# Patient Record
Sex: MALE | Race: BLACK OR AFRICAN AMERICAN | NOT HISPANIC OR LATINO | Employment: UNEMPLOYED | URBAN - METROPOLITAN AREA
[De-identification: names, ages, dates, MRNs, and addresses within clinical notes are randomized per-mention and may not be internally consistent; named-entity substitution may affect disease eponyms.]

---

## 2024-11-05 ENCOUNTER — OFFICE VISIT (OUTPATIENT)
Dept: URGENT CARE | Facility: CLINIC | Age: 44
End: 2024-11-05
Payer: MEDICAID

## 2024-11-05 VITALS
RESPIRATION RATE: 16 BRPM | HEIGHT: 71 IN | WEIGHT: 143 LBS | HEART RATE: 91 BPM | OXYGEN SATURATION: 98 % | DIASTOLIC BLOOD PRESSURE: 81 MMHG | BODY MASS INDEX: 20.02 KG/M2 | TEMPERATURE: 98.7 F | SYSTOLIC BLOOD PRESSURE: 126 MMHG

## 2024-11-05 DIAGNOSIS — J02.9 SORE THROAT: ICD-10-CM

## 2024-11-05 DIAGNOSIS — J02.9 VIRAL PHARYNGITIS: Primary | ICD-10-CM

## 2024-11-05 LAB — POC GROUP A STREP, PCR: NOT DETECTED

## 2024-11-05 PROCEDURE — 87651 STREP A DNA AMP PROBE: CPT | Mod: QW

## 2024-11-05 PROCEDURE — 99213 OFFICE O/P EST LOW 20 MIN: CPT

## 2024-11-05 RX ORDER — INSULIN GLARGINE 100 [IU]/ML
INJECTION, SOLUTION SUBCUTANEOUS
COMMUNITY
Start: 2023-06-23

## 2024-11-05 RX ORDER — INSULIN LISPRO 100 [IU]/ML
INJECTION, SOLUTION INTRAVENOUS; SUBCUTANEOUS
COMMUNITY
Start: 2023-04-07

## 2024-11-05 RX ORDER — BLOOD-GLUCOSE,RECEIVER,CONT
EACH MISCELLANEOUS
COMMUNITY
Start: 2024-09-13

## 2024-11-05 RX ORDER — DIVALPROEX SODIUM 500 MG/1
TABLET, FILM COATED, EXTENDED RELEASE ORAL
COMMUNITY
Start: 2020-03-17

## 2024-11-05 RX ORDER — IBUPROFEN 800 MG/1
TABLET ORAL
COMMUNITY
Start: 2024-04-22

## 2024-11-05 RX ORDER — ARIPIPRAZOLE 10 MG/1
TABLET ORAL
COMMUNITY
Start: 2024-01-04

## 2024-11-05 RX ORDER — BENZONATATE 100 MG/1
100 CAPSULE ORAL 3 TIMES DAILY PRN
COMMUNITY
Start: 2024-10-30 | End: 2024-11-06

## 2024-11-05 RX ORDER — IBUPROFEN 200 MG
CAPSULE ORAL
COMMUNITY
Start: 2023-07-26

## 2024-11-05 RX ORDER — LUMATEPERONE 42 MG/1
42 CAPSULE ORAL
COMMUNITY

## 2024-11-05 RX ORDER — IBUPROFEN 200 MG
16 TABLET ORAL
COMMUNITY
Start: 2024-10-19 | End: 2024-11-18

## 2024-11-05 NOTE — PROGRESS NOTES
[facility] URGENT CARE ERIC NOTE:      Name: Bennie Mantilla, 43 y.o.    CSN:[id number]   MRN:[id number]    PCP: No Assigned PCP Generic Provider, MD    ALL:  No Known Allergies    History:    Chief Complaint: Sore Throat (Sore throat x 5 days, worsening over last 24 hours )    Encounter Date: 11/5/2024      HPI: The history was obtained from the patient. Bennie is a 43 y.o. male, who presents with a chief complaint of Sore Throat (Sore throat x 5 days, worsening over last 24 hours ).  Patient states he has had a sore throat for 5 days that has worsened over the last 24 hours.  He states it is painful to swallow.  He also endorses a mild dry cough and mucus drainage.  He has not taken any over-the-counter medications at this time.  He denies fevers, chills, nausea, vomiting, headache, changes in vision, chest pain, shortness of breath, abdominal pain.    PMHx:    No past medical history on file.           Current Outpatient Medications   Medication Sig Dispense Refill    ARIPiprazole (Abilify) 10 mg tablet       benzonatate (Tessalon) 100 mg capsule Take 1 capsule (100 mg) by mouth 3 times a day as needed.      blood sugar diagnostic (Blood Glucose Test) strip Use  4 times a day.Dg code E10.65 .      Caplyta 42 mg capsule Take 1 capsule (42 mg) by mouth once daily.      Dexcom G7  misc       divalproex (Depakote ER) 500 mg 24 hr tablet Take by mouth.      glucagon (Glucagen) 1 mg injection INJECT 1 ML Once 1mL as needed for hypoglycemia      glucose 4 gram chewable tablet Chew 4 tablets (16 g).      ibuprofen 800 mg tablet       insulin lispro (HumaLOG) 100 unit/mL injection Use as directed 3 times daily, approx 60 units daily. E10.65 .      Lantus Solostar U-100 Insulin 100 unit/mL (3 mL) pen Inject under the skin 20 units every evening .       No current facility-administered medications for this visit.         PMSx:    Past Surgical History:   Procedure Laterality Date    MR HEAD  ANGIO WO IV CONTRAST  3/14/2023    MR HEAD ANGIO WO IV CONTRAST 3/14/2023    MR NECK ANGIO W AND WO IV CONTRAST  3/14/2023    MR NECK ANGIO W AND WO IV CONTRAST 3/14/2023    OTHER SURGICAL HISTORY  11/23/2020    Femur fracture repair       Fam Hx: No family history on file.    SOC. Hx:     Social History     Socioeconomic History    Marital status: Single     Spouse name: Not on file    Number of children: Not on file    Years of education: Not on file    Highest education level: Not on file   Occupational History    Not on file   Tobacco Use    Smoking status: Not on file    Smokeless tobacco: Not on file   Substance and Sexual Activity    Alcohol use: Not on file    Drug use: Not on file    Sexual activity: Not on file   Other Topics Concern    Not on file   Social History Narrative    Not on file     Social Drivers of Health     Financial Resource Strain: Not on file   Food Insecurity: Patient Declined (10/27/2024)    Received from [facility]    Hunger Vital Sign     Worried About Running Out of Food in the Last Year: Patient declined     Ran Out of Food in the Last Year: Patient declined   Transportation Needs: Patient Declined (10/27/2024)    Received from [facility]    PRAPARE - Transportation     Lack of Transportation (Medical): Patient declined     Lack of Transportation (Non-Medical): Patient declined   Physical Activity: Not on file   Stress: Not on file   Social Connections: Not on file   Intimate Partner Violence: Patient Declined (10/27/2024)    Received from [facility]    Humiliation, Afraid, Rape, and Kick questionnaire     Fear of Current or Ex-Partner: Patient declined     Emotionally Abused: Patient declined     Physically Abused: Patient declined     Sexually Abused: Patient declined   Housing Stability: Patient Declined (10/27/2024)    Received from [facility]    Housing Stability Vital Sign     Unable to Pay for Housing in the Last Year: Patient declined     Number of Times Moved in the Last  Year: 2     Homeless in the Last Year: Patient declined         Vitals:    11/05/24 1208   BP: 126/81   Pulse: 91   Resp: 16   Temp: 37.1 °C (98.7 °F)   SpO2: 98%     64.9 kg (143 lb)          Physical Exam  Vitals reviewed.   Constitutional:       General: He is not in acute distress.     Appearance: Normal appearance. He is not ill-appearing.   HENT:      Right Ear: External ear normal.      Left Ear: External ear normal.      Nose: Nose normal. No congestion.      Mouth/Throat:      Mouth: Mucous membranes are moist.      Tongue: No lesions. Tongue does not deviate from midline.      Palate: No mass and lesions.      Pharynx: Oropharynx is clear. Uvula midline. Posterior oropharyngeal erythema present. No pharyngeal swelling, oropharyngeal exudate or uvula swelling.      Tonsils: No tonsillar exudate or tonsillar abscesses. 1+ on the right. 1+ on the left.   Eyes:      Extraocular Movements: Extraocular movements intact.      Conjunctiva/sclera: Conjunctivae normal.      Pupils: Pupils are equal, round, and reactive to light.   Cardiovascular:      Rate and Rhythm: Normal rate and regular rhythm.      Pulses: Normal pulses.      Heart sounds: Normal heart sounds. No murmur heard.  Pulmonary:      Effort: Pulmonary effort is normal. No respiratory distress.      Breath sounds: Normal breath sounds. No stridor. No wheezing, rhonchi or rales.   Abdominal:      General: Abdomen is flat.      Palpations: Abdomen is soft.   Musculoskeletal:      Cervical back: Normal range of motion and neck supple.   Lymphadenopathy:      Cervical: Cervical adenopathy present.   Skin:     General: Skin is warm.      Capillary Refill: Capillary refill takes less than 2 seconds.      Findings: No rash.   Neurological:      General: No focal deficit present.      Mental Status: He is alert and oriented to person, place, and time.   Psychiatric:         Mood and Affect: Mood normal.         Behavior: Behavior normal.         I did  personally review Bennie's past medical history, surgical history, social history, as well as family history (when relevant).  In this case, I also oversaw the his drug management by reviewing his medication list, allergy list, as well as the medications that I prescribed during the UC course and/or recommended as an out-patient (including possible OTC medications such as acetaminophen, NSAIDs , etc).    After reviewing the items above, I did look at previous medical documentation, such as recent hospitalizations, office visits, and/or recent consultations with PCP/specialist.                          SDOH:   Another factor that I considered in Bennie's care was his Social Determinants of Health (SDOH). During this UC encounter, he did not have social determinants of health. Those SDOH influencing Bennie's care are: none    LABORATORY @ RADIOLOGICAL IMAGING (if done):     Results for orders placed or performed in visit on 11/05/24 (from the past 24 hours)   POCT Group A Streptococcus, PCR manually resulted   Result Value Ref Range    POC Group A Strep, PCR Not Detected Not Detected       UC COURSE/MEDICAL DECISION MAKING:    Bennie is a 43 y.o., who presents with a working diagnosis of   1. Viral pharyngitis    2. Sore throat      Bennie was seen today for sore throat.  Diagnoses and all orders for this visit:  Viral pharyngitis (Primary)  Sore throat  -     POCT Group A Streptococcus, PCR manually resulted  After my independent evaluation, he appears to have a self-limiting illness likely due to a viral pharyngitis.   At this time, there is a no evidence of pneumonia, hypoxia, OM, bacterial sinus infection, bacterial bronchitis, bacteremia, or sepsis.     As we discussed, he is to return to our office or ER immediately if there is any worsening of his condition, such as increased cough, shortness of breath, persistent fevers, repeated vomiting, dehydration, or if his condition worsens at all.        Shereen  "NIKHIL Grant   Advanced Practice Provider  [facility] URGENT CARE    Please note: Portions of this chart may have been created with Dragon voice recognition software. Occasional wrong-word or \"sound-like\" substitutions may have occurred due to inherent limitations of the voice recognition software. Please excuse any typographical or grammatical errors contained herein. Please read the chart carefully and recognize, using context, where the substitutions have occurred.   "

## 2025-06-25 ENCOUNTER — OFFICE VISIT (OUTPATIENT)
Dept: URGENT CARE | Facility: CLINIC | Age: 45
End: 2025-06-25
Payer: MEDICAID

## 2025-06-25 ENCOUNTER — HOSPITAL ENCOUNTER (OUTPATIENT)
Dept: RADIOLOGY | Facility: CLINIC | Age: 45
Discharge: HOME | End: 2025-06-25
Payer: MEDICAID

## 2025-06-25 VITALS
DIASTOLIC BLOOD PRESSURE: 83 MMHG | RESPIRATION RATE: 16 BRPM | HEART RATE: 82 BPM | TEMPERATURE: 97.5 F | OXYGEN SATURATION: 99 % | SYSTOLIC BLOOD PRESSURE: 133 MMHG

## 2025-06-25 DIAGNOSIS — M79.641 RIGHT HAND PAIN: ICD-10-CM

## 2025-06-25 DIAGNOSIS — M79.641 RIGHT HAND PAIN: Primary | ICD-10-CM

## 2025-06-25 PROCEDURE — 99214 OFFICE O/P EST MOD 30 MIN: CPT

## 2025-06-25 PROCEDURE — 73130 X-RAY EXAM OF HAND: CPT | Mod: RIGHT SIDE | Performed by: RADIOLOGY

## 2025-06-25 PROCEDURE — 73130 X-RAY EXAM OF HAND: CPT | Mod: RT

## 2025-06-25 NOTE — PROGRESS NOTES
[facility] URGENT CARE ERIC NOTE:      Name: Bennie Mantilla, 44 y.o.    CSN:[id number]   MRN:[id number]    PCP: No Assigned PCP Generic Provider, MD    ALL:  Allergies[1]    History:    Chief Complaint: R hand pain (Swelling x 2 week after hitting it on the sink at work)    Encounter Date: 6/25/2025      HPI: The history was obtained from the patient. Bennie is a 44 y.o. male, who presents with a chief complaint of R hand pain (Swelling x 2 week after hitting it on the sink at work).  Patient states he was working at [employer] and he hit his right hand on the edge of the sink.  He endorses swelling and pain to the right 2nd and 3rd MCP joint and proximal digits.  No range of motion deficits.  No loss of sensation.  Denies fevers, chills.    PMHx:    Medical History[2]         Current Medications[3]      PMSx:  Surgical History[4]    Fam Hx: Family History[5]    SOC. Hx:     Social History     Socioeconomic History    Marital status: Single     Spouse name: Not on file    Number of children: Not on file    Years of education: Not on file    Highest education level: Not on file   Occupational History    Not on file   Tobacco Use    Smoking status: Every Day     Current packs/day: 1.00     Types: Cigarettes    Smokeless tobacco: Never   Substance and Sexual Activity    Alcohol use: Not on file    Drug use: Not on file    Sexual activity: Not on file   Other Topics Concern    Not on file   Social History Narrative    Not on file     Social Drivers of Health     Financial Resource Strain: Not on file   Food Insecurity: Patient Declined (11/8/2024)    Received from     Hunger Vital Sign     Worried About Running Out of Food in the Last Year: Patient declined     Ran Out of Food in the Last Year: Patient declined   Transportation Needs: Patient Declined (11/8/2024)    Received from     PRAPARE - Transportation     Lack of Transportation (Medical): Patient declined     Lack of  Transportation (Non-Medical): Patient declined   Physical Activity: Not on file   Stress: Not on file   Social Connections: Not on file   Intimate Partner Violence: Patient Declined (11/8/2024)    Received from [facility]    Humiliation, Afraid, Rape, and Kick questionnaire     Fear of Current or Ex-Partner: Patient declined     Emotionally Abused: Patient declined     Physically Abused: Patient declined     Sexually Abused: Patient declined   Housing Stability: Patient Declined (11/8/2024)    Received from [facility]    Housing Stability Vital Sign     Unable to Pay for Housing in the Last Year: Patient declined     Number of Times Moved in the Last Year: 2     Homeless in the Last Year: Patient declined         Vitals:    06/25/25 1122   BP: 133/83   Pulse: 82   Resp: 16   Temp: 36.4 °C (97.5 °F)   SpO2: 99%                Physical Exam  Vitals reviewed.   Constitutional:       General: He is not in acute distress.     Appearance: Normal appearance. He is not ill-appearing.   HENT:      Right Ear: Tympanic membrane normal.      Left Ear: Tympanic membrane normal.      Nose: Nose normal.      Mouth/Throat:      Mouth: Mucous membranes are moist.   Eyes:      Extraocular Movements: Extraocular movements intact.      Pupils: Pupils are equal, round, and reactive to light.   Cardiovascular:      Rate and Rhythm: Normal rate.      Pulses: Normal pulses.   Pulmonary:      Effort: Pulmonary effort is normal.   Musculoskeletal:      Right hand: Swelling and tenderness present. No deformity. Normal range of motion. Normal sensation. Normal capillary refill. Normal pulse.      Left hand: Normal.      Comments: Mild swelling noted to the 2nd and 3rd dorsal MCP joint.  Patient has tenderness to the proximal 2nd and 3rd digits without swelling.  Patient is able to flex and extend all digits without difficulty.  There is no snuffbox tenderness.  Radial pulses are 2+ bilaterally and equal.  Capillary refills less than 2 seconds.   No loss of sensation.   Skin:     General: Skin is warm.      Capillary Refill: Capillary refill takes less than 2 seconds.   Neurological:      General: No focal deficit present.      Mental Status: He is alert and oriented to person, place, and time.   Psychiatric:         Mood and Affect: Mood normal.         Behavior: Behavior normal.       I did personally review Bennie's past medical history, surgical history, social history, as well as family history (when relevant).  In this case, I also oversaw the his drug management by reviewing his medication list, allergy list, as well as the medications that I prescribed during the UC course and/or recommended as an out-patient (including possible OTC medications such as acetaminophen, NSAIDs , etc).    After reviewing the items above, I did look at previous medical documentation, such as recent hospitalizations, office visits, and/or recent consultations with PCP/specialist.                          SDOH:   Another factor that I considered in Bennie's care was his Social Determinants of Health (SDOH). During this UC encounter, he did not have social determinants of health. Those SDOH influencing Bennie's care are: none    LABORATORY @ RADIOLOGICAL IMAGING (if done):     Narrative & Impression   Interpreted By:  Eulalio Keller,   STUDY:  XR HAND RIGHT 3+ VIEWS; 6/25/2025 11:39 am      INDICATION:  Signs/Symptoms:right hand pain after hitting on a sink at work.      COMPARISON:  None.      ACCESSION NUMBER(S):  [id number]      ORDERING CLINICIAN:  CELIA DUARTE      TECHNIQUE:  Right hand three views      FINDINGS:  No fractures or destructive lesions are identified. Proximal phalanx  of the ring finger is partially obscured by the patient's ring.      IMPRESSION:  No acute pathologic findings are identified.      MACRO:  none      Signed by: Eulalio Keller 6/25/2025 1:24 PM  Dictation workstation:   [id number]       UC COURSE/MEDICAL DECISION  "MAKING:    Bennie is a 44 y.o., who presents with a working diagnosis of   1. Right hand pain      Bennie was seen today for r hand pain.  Diagnoses and all orders for this visit:  Right hand pain (Primary)  -     XR hand right 3+ views; Future    Patient presents with right hand pain x 2 weeks after hitting his hand on the sink at work.  Patient does have mild swelling noted to the dorsal aspect of the right hand at the 2nd and 3rd MCP joint.  He also has tenderness to the proximal 2nd and 3rd dorsal digits.  There is no overlying erythema, drainage.  He is able to flex and extend all digits without difficulty.  X-ray obtained which showed \"no acute pathologic findings are identified. \"Patient provided with Ace wrap.  Recommend he continue to ice, elevate the affected extremity.  Tylenol Motrin as needed for discomfort.  Recommend patient follow with primary care fighter within 72 hours Funches visit.  Discussed with patient if he develops fevers, chills, nausea, vomiting, loss sensation of the extremity, decreased range of motion, new or worsening swelling, no improvement in symptoms, or any other concerns he should return to the urgent care or report to the ER immediately.  Patient verbalizes understanding was agreeable to this plan of care.    Shereen Grant PA-C   Advanced Practice Provider  [facility] URGENT CARE    Please note: Portions of this chart may have been created with Dragon voice recognition software. Occasional wrong-word or \"sound-like\" substitutions may have occurred due to inherent limitations of the voice recognition software. Please excuse any typographical or grammatical errors contained herein. Please read the chart carefully and recognize, using context, where the substitutions have occurred.          [1] No Known Allergies  [2] No past medical history on file.  [3]   Current Outpatient Medications   Medication Sig Dispense Refill    blood sugar diagnostic " (Blood Glucose Test) strip Use  4 times a day.Dg code E10.65 .      Dexcom G7  misc       glucagon (Glucagen) 1 mg injection INJECT 1 ML Once 1mL as needed for hypoglycemia      insulin lispro (HumaLOG) 100 unit/mL injection Use as directed 3 times daily, approx 60 units daily. E10.65 .      Lantus Solostar U-100 Insulin 100 unit/mL (3 mL) pen Inject under the skin 20 units every evening .      ARIPiprazole (Abilify) 10 mg tablet  (Patient not taking: Reported on 6/25/2025)      Caplyta 42 mg capsule Take 1 capsule (42 mg) by mouth once daily. (Patient not taking: Reported on 6/25/2025)      divalproex (Depakote ER) 500 mg 24 hr tablet Take by mouth. (Patient not taking: Reported on 6/25/2025)      glucose 4 gram chewable tablet Chew 4 tablets (16 g).      ibuprofen 800 mg tablet  (Patient not taking: Reported on 6/25/2025)       No current facility-administered medications for this visit.   [4]   Past Surgical History:  Procedure Laterality Date    MR HEAD ANGIO WO IV CONTRAST  3/14/2023    MR HEAD ANGIO WO IV CONTRAST 3/14/2023    MR NECK ANGIO W AND WO IV CONTRAST  3/14/2023    MR NECK ANGIO W AND WO IV CONTRAST 3/14/2023    OTHER SURGICAL HISTORY  11/23/2020    Femur fracture repair   [5] No family history on file.

## 2025-08-19 ENCOUNTER — OFFICE VISIT (OUTPATIENT)
Dept: URGENT CARE | Facility: CLINIC | Age: 45
End: 2025-08-19
Payer: MEDICAID

## 2025-08-19 VITALS
HEIGHT: 67 IN | TEMPERATURE: 97.7 F | WEIGHT: 154 LBS | RESPIRATION RATE: 16 BRPM | DIASTOLIC BLOOD PRESSURE: 85 MMHG | SYSTOLIC BLOOD PRESSURE: 131 MMHG | OXYGEN SATURATION: 97 % | BODY MASS INDEX: 24.17 KG/M2 | HEART RATE: 80 BPM

## 2025-08-19 DIAGNOSIS — S80.219A ABRASION OF KNEE, UNSPECIFIED LATERALITY, INITIAL ENCOUNTER: Primary | ICD-10-CM

## 2025-08-19 PROCEDURE — 99213 OFFICE O/P EST LOW 20 MIN: CPT | Performed by: NURSE PRACTITIONER

## 2025-08-19 RX ORDER — BACITRACIN 500 [USP'U]/G
1 OINTMENT TOPICAL 3 TIMES DAILY
Qty: 28 G | Refills: 0 | Status: SHIPPED | OUTPATIENT
Start: 2025-08-19 | End: 2025-08-27